# Patient Record
Sex: MALE | Race: WHITE | Employment: OTHER | URBAN - METROPOLITAN AREA
[De-identification: names, ages, dates, MRNs, and addresses within clinical notes are randomized per-mention and may not be internally consistent; named-entity substitution may affect disease eponyms.]

---

## 2022-01-21 ENCOUNTER — HOSPITAL ENCOUNTER (INPATIENT)
Age: 22
LOS: 3 days | Discharge: HOME | DRG: 885 | End: 2022-01-24
Attending: PSYCHIATRY & NEUROLOGY | Admitting: PSYCHIATRY & NEUROLOGY
Payer: OTHER GOVERNMENT

## 2022-01-21 PROBLEM — F32.2 MAJOR DEPRESSIVE DISORDER, SINGLE EPISODE, SEVERE WITH ANXIOUS DISTRESS (HCC): Status: ACTIVE | Noted: 2022-01-21

## 2022-01-21 PROBLEM — F32.A DEPRESSION: Status: ACTIVE | Noted: 2022-01-21

## 2022-01-21 PROBLEM — F32.A DEPRESSION: Status: RESOLVED | Noted: 2022-01-21 | Resolved: 2022-01-21

## 2022-01-21 PROCEDURE — 99222 1ST HOSP IP/OBS MODERATE 55: CPT | Performed by: PSYCHIATRY & NEUROLOGY

## 2022-01-21 PROCEDURE — 65220000003 HC RM SEMIPRIVATE PSYCH

## 2022-01-21 RX ORDER — HYDROXYZINE PAMOATE 50 MG/1
50 CAPSULE ORAL
Status: DISCONTINUED | OUTPATIENT
Start: 2022-01-21 | End: 2022-01-24 | Stop reason: HOSPADM

## 2022-01-21 RX ORDER — TRAZODONE HYDROCHLORIDE 50 MG/1
50 TABLET ORAL
Status: DISCONTINUED | OUTPATIENT
Start: 2022-01-21 | End: 2022-01-24 | Stop reason: HOSPADM

## 2022-01-21 NOTE — H&P
[address] HISTORY AND PHYSICAL    Name:  Nithin Chan  MR#:   [id number]  :  2000  ACCOUNT #:  [de-identified]  ADMIT DATE:  2022    IDENTIFYING INFORMATION:  The patient is a 58-year-old  male, admitted to this facility on the above-mentioned date. BASIS FOR ADMISSION:  The patient presented himself to [facility] on 2022 describing been feeling depressed since joining the [employer] three and a half years ago. He endorsed multiple problems including poor sleep, anhedonia, feelings of guilt, low energy and poor concentration. He also described a weight gain of around 40 pounds over the past year, this being related to lack of exercise and activity. Of importance is that he did mention that his depressive symptoms are almost nonexistent when he was away from the [employer] and goes home in [place]. When examined in the emergency department with the [facility], he was considered to be actively suicidal.  However, able to maintain self-control in the facility. For that reason, due to lack of bed availability, he was sent to our facility for further inpatient treatment, this resulting on his admission to the hospital.    PSYCHIATRIC HISTORY:  The patient has never been treated before. His difficulties with depression appeared to be again associated to his being in the [employer] as stated. He has been  for 1 year he said, and in general, the relationship with his wife is okay. However, it is not clear as to how open he is letting her know how depressed he has felt because of his being in the [employer]. He did say that as a result of his being depressed and specifically anhedonic, he tends not to do very much with his wife who is around his same age, wanting to go out and do things, something that he does not usually offer her to do. Some tension has been described, however in general, she remains supportive of him. She was the one who suggested the patient to go to the ER to be evaluated. MEDICAL HISTORY:  Negative with exception of depression. He denies any type of drug use. Weight gain associated to the depression described. REVIEW OF SYSTEMS:  Negative with exception of the psychiatric system review, remarkable for the presence of depression and suicidal thoughts. PHYSICAL EXAMINATION:  The physical exam at the [facility] facility, blood pressure was 119/75, heart rate 72, respirations 18, oxygen saturation rate 99%, temperature 98.2. Described as alert, in no acute physical distress. Physical examination was basically negative with exception of his psychiatric examination which was remarkable with presence of depression and suicidal thoughts. Neurological examination was also found to be negative. LAB TESTS:  Multiple labs were performed at the emergency department with the [facility] including a CBC with differential that basically showed normal test results. Blood chemistry showed a sodium of 140, potassium 3.9, chloride of 106, blood sugar 86, BUN 12.2, creatinine 0.90, estimated GFR above 60 mL/min, specifically 120.8 mL/minute. Liver function tests completely normal.  Alcohol levels below 10. COVID rapid testing had shown negative results. Acetaminophen and salicylate levels were below range. The urine drug screen was apparently negative. ALCOHOL AND DRUG HISTORY:  The patient denies the use of alcohol, illicit drugs, over-the-counter medications and/or abusing prescription medications. PERSONAL HISTORY AND FAMILY HISTORY:  The patient described that he is actually the youngest of three siblings. Has a brother and sister, both of them  with children of their own. The patient and his family had lived in the bottom part of [place], he said, for many years now. He met his wife around a year or so ago. She is the cousin cousin of one of his brother's friends.   Relationship is impaired because of the patient's depression and his inability to do things with her, he says. He has apparently another 3 or 4 years in the [facility]. He has tried to get outpatient appointments, however it has been difficult for him to do it through the [facility]. He indicated that the psychiatrist that he saw in the emergency room is suggesting the possibility of starting the steps that he has to take to be able to be released from the [facility]. MENTAL STATUS EXAM:  Is that of a male who looks his stated age. During this evaluation, there is no evidence of alcohol or any other type of drug-related signs of intoxication or withdrawal symptoms. He is coherent, shows quality of continuity of associations without evidence of flight of ideas, pressure of speech, ideas of reference or influence or any hallucinations. He is depressed. He described some helplessness and hopelessness and specifically anhedonia with suicidal thoughts, however he is able, willing and capable to talk to our staff if unable to control thoughts of self-harm. During the evaluation, there is no evidence of cognitive impairment even though he was not tested formally. He is considered to have capacity. CLINICAL IMPRESSION:  AXIS I:  Major depressive disorder, single, without psychosis. AXIS II:  Noncontributory. AXIS III:  Noncontributory. TREATMENT PLAN:  1. The patient was admitted to the adult CD program, will be seen daily and will be referred to the groups within context of the program.  2.  He will have an assigned inpatient  to help with providing the support and treatment that he requires individually, but also with referrals back to the [facility]. 3.  The patient was advised about our wanting to prescribe him with an antidepressant, however, he prefers not to take anything for his depression, he says.   He is very well aware that his depression is reactive to his being in the [facility], and not being able to be with his family in [place]. So he refuses antidepressant therapy. ESTIMATED LENGTH OF STAY AND PROGNOSIS:  ELOS is 3 to 5 days. The patient will have to have outpatient treatment after he is discharged from our facility. Depending upon his depression not improving enough, a decision will have to be made by the way if he actually will require to be referred back to the inpatient care in the [facility] and through this be discharged to outpatient treatment through, after being treated there as an inpatient. Obviously will depend upon how the patient does in several days. PROGNOSIS:  Fair. He will do better taking an antidepressant in addition to his having treatment with individual and group therapy sessions. However, again he prefers not to do so. So, his prognosis is only fair to guarded. Rock Debbie MD, LFAPA      [id number]  D:  01/21/2022 11:10  T:  01/21/2022 13:31  JOB #:  [id number]    Behavioral Services  Medicare Certification Upon Admission    I certify that this patient's inpatient psychiatric hospital admission is medically necessary for:      [x] Treatment which could reasonably be expected to improve this patient's condition,       [] For diagnostic study;     AND     [x] The inpatient psychiatric services are provided while the individual is under the care of a physician and are included in the individualized plan of care. Estimated length of stay/service 5 days or so. Plan for post-hospital care:  The patient will possibly be referred for admission to [facility].    Electronically signed by [unfilled] on 1/22/2022 at 12:24 PM

## 2022-01-21 NOTE — BH NOTES
Nursing Admission Note    Patient arrived onto unit via in a wheelchair dressed in  paper scrubs and accompanied by security staff. Patient is cooperative with admissions process, did fill out paperwork, and did allow nurse to orient to the unit. Patient currently on  safety/suicide/falls precautions, and general observation rounding. Patient Interacted appropriately and presents as Depressed, with Cooperative disposition and Preoccupied thought content. Patient reports intermittent depression and suicidal ideations since joining the [employer] approx 1 year ago. He denies particular stressors, just feeling \"trapped\" since joining. He denies physical health problems, does not take medication, and is not interested in taking medications while admitted. He states he has been advised by psychiatrist at [facility] on how to start the process of administrative discharge. Patient voluntary for treatment at this time. Patient given hygiene bag and escorted to room. Will continue to provide support as needed. RN's will initiate, develop, implement, revise or review treatment plan.

## 2022-01-21 NOTE — BSMART NOTE
Social Work Group Progress Note    Time: 8418    Attendance:  Did not attend     Participation Level: Did not attend     Observation: Did not attend

## 2022-01-21 NOTE — GROUP NOTE
LISA  GROUP DOCUMENTATION INDIVIDUAL                                                                          Group Therapy Note    Date: 1/21/2022    Group Start Time: 2532  Group End Time: 1430  Group Topic: Reflection/Relaxation    [facility] 1 ADULT CHEM [address], RN    IP [facility] GROUP DOCUMENTATION GROUP    Group Therapy Note    Attendees:6         Attendance: Did not attend      Rachel Donovan RN

## 2022-01-21 NOTE — PROGRESS NOTES
Problem: Suicide  Goal: *STG: Remains safe in hospital  Description: AEB pt not harming self or others, every shift  Outcome: Progressing Towards Goal     Problem: Falls - Risk of  Goal: *Absence of Falls  Description: Document Dafne Dahl Fall Risk and appropriate interventions in the flowsheet. Outcome: Progressing Towards Goal  Note: Fall Risk Interventions:  Problem: Suicide  Goal: *STG: Attends activities and groups  Description: AEB pt attending 3 groups, daily  1/21/2022 1524 by Pita Montes RN  Outcome: Not Progressing Towards Goal    [facility] has not eaten meals yet today. He is not currently on scheduled medication. This morning he appeared overwhelmed at the acuity of the unit and has spent the day in his room. Has not attended groups despite encouragement. Will continue to provide support as needed.

## 2022-01-21 NOTE — H&P
Psychiatry History and Physical    Subjective:     Date of Evaluation:  1/21/2022    Reason for Referral:  Jenny Greene was referred to the examiners from [facility] ED for SI. History of Presenting Problem: Jenny rGeene is a 25 yo M with no significant PMH who was admitted for SI. Denies HI and hallucinations. EtOH, Tox screen and COVID test all negative. He denies any physical complaints today. Patient Active Problem List    Diagnosis Date Noted    Major depressive disorder, single episode, severe with anxious distress ([address] 01/21/2022     No past medical history on file. No past surgical history on file. No family history on file.    Social History     Tobacco Use    Smoking status: Not on file    Smokeless tobacco: Not on file   Substance Use Topics    Alcohol use: Not on file     Prior to Admission medications    Not on File     No Known Allergies     Review of Systems - History obtained from chart review and the patient  General ROS: negative  Psychological ROS: positive for - depression and suicidal ideation  Ophthalmic ROS: negative  ENT ROS: negative  Respiratory ROS: negative  Cardiovascular ROS: negative  Gastrointestinal ROS: negative  Musculoskeletal ROS: negative  Neurological ROS: negative  Dermatological ROS: negative      Objective:     Patient Vitals for the past 8 hrs:   BP Temp Pulse Resp Height Weight   01/21/22 0836 121/75 98 °F (36.7 °C) 87 18     01/21/22 0814     6' (1.829 m) 108.9 kg (240 lb)       Mental Status exam: WNL except for    Sensorium  oriented to time, place and person   Orientation situation   Relations cooperative   Eye Contact appropriate   Appearance:  age appropriate   Motor Behavior:  within normal limits   Speech:  normal pitch and normal volume   Vocabulary average   Thought Process: goal directed   Thought Content free of delusions and free of hallucinations   Suicidal ideations intention and no plan    Homicidal ideations no plan  and no intention Mood:  stable   Affect:  stable   Memory recent  adequate   Memory remote:  adequate   Concentration:  adequate   Abstraction:  concrete   Insight:  fair   Reliability good   Judgment:  fair         Physical Exam:   Visit Vitals  /75   Pulse 87   Temp 98 °F (36.7 °C)   Resp 18   Ht 6' (1.829 m)   Wt 108.9 kg (240 lb)   BMI 32.55 kg/m²     General appearance: alert, cooperative, no distress, appears stated age  Head: Normocephalic, without obvious abnormality, atraumatic  Eyes: negative  Ears: normal TM's and external ear canals AU  Nose: Nares normal. Septum midline. Mucosa normal. No drainage or sinus tenderness. Throat: Lips, mucosa, and tongue normal. Teeth and gums normal  Neck: supple, symmetrical, trachea midline and no adenopathy  Lungs: clear to auscultation bilaterally  Heart: regular rate and rhythm, S1, S2 normal, no murmur, click, rub or gallop  Abdomen: soft, non-tender. Extremities: extremities normal, atraumatic, no cyanosis or edema  Skin: Skin color, texture, turgor normal. No rashes or lesions  Neurologic: Grossly normal        Impression:      Principal Problem:    Major depressive disorder, single episode, severe with anxious distress ([facility] (1/21/2022)          Plan:     Recommendations for Treatment/Conditions:  Psychiatric treatment recommended while in hospital  Admit to inpatient psych for SI    Referral To:    Inpatient psychiatric care        [place], [place]   1/21/2022 11:49 AM

## 2022-01-22 LAB — TSH SERPL DL<=0.05 MIU/L-ACNC: 2.12 UIU/ML (ref 0.36–3.74)

## 2022-01-22 PROCEDURE — 65220000003 HC RM SEMIPRIVATE PSYCH

## 2022-01-22 PROCEDURE — 99231 SBSQ HOSP IP/OBS SF/LOW 25: CPT | Performed by: PSYCHIATRY & NEUROLOGY

## 2022-01-22 PROCEDURE — 36415 COLL VENOUS BLD VENIPUNCTURE: CPT

## 2022-01-22 PROCEDURE — 84443 ASSAY THYROID STIM HORMONE: CPT

## 2022-01-22 NOTE — PROGRESS NOTES
Problem: Suicide  Goal: *STG: Remains safe in hospital  Description: AEB pt not harming self or others, every shift  Outcome: Progressing Towards Goal  Goal: *STG: Seeks staff when feelings of self harm or harm towards others arise  Description: AEB seeking staff when feelings of self harm or harm towards others arise, as needed  Outcome: Progressing Towards Goal  Goal: *STG: Attends activities and groups  Description: AEB pt attending 3 groups, daily  Outcome: Progressing Towards Goal  Patient isolative to his room, voiced no complaint. Out of room for meals. Affect is flat, mood stable.

## 2022-01-22 NOTE — BH NOTES
Pt presents with dull affect, depressed mood. Pt has been withdrawn to self on the unit. Pt has been resting in his room for much of the evening. Pt denies SI/HI at this time. Pt is medication compliant. Will continue to monitor.

## 2022-01-22 NOTE — PROGRESS NOTES
[address] [facility] [address]  Inpatient Progress Note     Date of Service: 01/22/22  Hospital Day: 1     Subjective/Interval History   01/22/22    Treatment Team Notes:  Notes reviewed and/or discussed and report that Dixon Lomeli is the patient recently admitted to the facility, attention invited to the dictated admission note which is self-explanatory. Patient interview: Dixon Lomeli was interviewed by this writer today. Not much of a change regarding the patient's depression severity. However he continues to agree to talk to his staff if unable to maintain self-control. He has been somewhat withdrawn, and is still does not want to consider treatment with antidepressant therapy, however otherwise he is rather compliant. Objective     Visit Vitals  /77   Pulse 90   Temp 97.2 °F (36.2 °C)   Resp 16   Ht 6' (1.829 m)   Wt 108.9 kg (240 lb)   BMI 32.55 kg/m²     Vitals are stable    Recent Results (from the past 24 hour(s))   TSH 3RD GENERATION    Collection Time: 01/22/22 10:27 AM   Result Value Ref Range    TSH 2.12 0.36 - 3.74 uIU/mL     Above results noted    Mental Status Examination     Appearance/Hygiene 24 y.o. WHITE/NON- male  Hygiene: somewhat limited   Behavior/Social Relatedness  withdrawn at times   Musculoskeletal Gait/Station: appropriate  Tone (flaccid, cogwheeling, spastic): not assessed  Psychomotor (hyperkinetic, hypokinetic): calm   Involuntary movements (tics, dyskinesias, akathisa, stereotypies): none   Speech   Rate, rhythm, volume, fluency and articulation are appropriate   Mood   depressed   Affect    congruent   Thought Process Linear and goal directed   Thought Content and Perceptual Disturbances  suicidal thoughts are improving, denies auditory and visual hallucinations, ideas of reference or influence or any delusional thoughts. However he still feels helpless at times.    Sensorium and Cognition  Grossly intact   Insight  fair   Judgment  fair Assessment/Plan      Psychiatric Diagnoses:   Patient Active Problem List   Diagnosis Code    Major depressive disorder, single episode, severe with anxious distress ([facility] F32.2       Medical Diagnoses: Same    Psychosocial and contextual factors: Same    Level of impairment/disability: To be determined. 1.  We will continue to suggest treatment with antidepressant therapy. Otherwise depending upon how the patient does in the near future, further hospitalization or supplemental hospital will be determined. 2.  Reviewed instructions, risks, benefits and side effects of medications  3. Disposition/Discharge Date: self-care/home, to be determined.     Sandrine Barrios MD, [address] [place] [address]  Psychiatry

## 2022-01-23 PROCEDURE — 99231 SBSQ HOSP IP/OBS SF/LOW 25: CPT | Performed by: PSYCHIATRY & NEUROLOGY

## 2022-01-23 PROCEDURE — 65220000003 HC RM SEMIPRIVATE PSYCH

## 2022-01-23 NOTE — PROGRESS NOTES
[address]  Inpatient Progress Note     Date of Service: 01/23/22  Hospital Day: 2     Subjective/Interval History   01/23/22    Treatment Team Notes:  Notes reviewed and/or discussed and report that Oma Gould is a patient with a history of depression, attention invited to the dictated admission note which is self-explanatory. The patient was referred to acute inpatient psychiatric care here at [facility] after being evaluated at [facility] emergency department. Patient interview: Oma Gould was interviewed by this writer today. The patient described his depression improving, even though that he has refused treatment with antidepressant therapy. During initial part of the session, he indicated that he wanted to be discharged from inpatient care, however he was advised about the steps that we have to take since he was referred to us from the  [facility].  If the patient continues to demand discharge, we will evaluate for a temporary detaining order. Nursing staff informed. Objective     Visit Vitals  /72   Pulse 73   Temp 97 °F (36.1 °C)   Resp 17   Ht 6' (1.829 m)   Wt 108.9 kg (240 lb)   BMI 32.55 kg/m²     Vitals are stable    No results found for this or any previous visit (from the past 24 hour(s)). Mental Status Examination     Appearance/Hygiene 24 y.o.  WHITE/NON- male  Hygiene: Limited   Behavior/Social Relatedness  withdrawn   Musculoskeletal Gait/Station: appropriate  Tone (flaccid, cogwheeling, spastic): not assessed  Psychomotor (hyperkinetic, hypokinetic): calm   Involuntary movements (tics, dyskinesias, akathisa, stereotypies): none   Speech   Rate, rhythm, volume, fluency and articulation are appropriate   Mood   depression is improving   Affect    congruent   Thought Process Linear and goal directed   Thought Content and Perceptual Disturbances Denies self-injurious behavior (SIB), suicidal ideation (SI), aggressive behavior or homicidal ideation (HI)    Denies auditory and visual hallucinations, intensity of his helplessness and hopelessness is less. Sensorium and Cognition  Grossly intact   Insight   Limited   Judgment  fair        Assessment/Plan      Psychiatric Diagnoses:   Patient Active Problem List   Diagnosis Code    Major depressive disorder, single episode, severe with anxious distress ([facility] F32.2       Medical Diagnoses: Same    Psychosocial and contextual factors: Same    Level of impairment/disability: Moderate    1.  the assigned inpatient  will be asked tomorrow to contact personal at the [facility].  Very possibly before he is directly referred to outpatient treatment, he will have to be evaluated by one of their psychiatrists. The patient was informed about this. 2.  Reviewed instructions, risks, benefits and side effects of medications  3.   Disposition/Discharge Date: self-care/home, TBD    Tai Pal MD, [address] [place] [address]

## 2022-01-23 NOTE — PROGRESS NOTES
Problem: Suicide  Goal: *STG: Remains safe in hospital  Description: AEB pt not harming self or others, every shift  Outcome: Progressing Towards Goal  Goal: *STG: Seeks staff when feelings of self harm or harm towards others arise  Description: AEB seeking staff when feelings of self harm or harm towards others arise, as needed  Outcome: Progressing Towards Goal  Goal: *STG: Attends activities and groups  Description: AEB pt attending 3 groups, daily  Outcome: Progressing Towards Goal   Patient has remained in his room , out for meals, withdrawn, affect flat. Continue to be depressed. Will continue to monitor and provide a safe environment.

## 2022-01-23 NOTE — GROUP NOTE
IP  GROUP DOCUMENTATION INDIVIDUAL                                                                          Group Therapy Note    Date: 1/22/2022    Group Start Time: 2000  Group End Time: 2030  Group Topic: Medication    [facility] 1 ADULT CHEM DEP    Robbi Delcid RN    IP [facility] GROUP DOCUMENTATION GROUP    Group Therapy Note    Attendees: 7         Attendance: Did not attend    Patient's Goal:  Understanding medication being provided as scheduled and as needed. Additional Notes:  Pt has been quiet and isolated to self. Alert and oriented x 4. Will continue to monitor and support as needed.      Ronda Ham RN

## 2022-01-23 NOTE — DISCHARGE INSTRUCTIONS
BEHAVIORAL HEALTH NURSING DISCHARGE NOTE      The following personal items collected during your admission are returned to you:   Dental Appliance: Dental Appliances: None  Vision:    Hearing Aid:    Jewelry:    Clothing:    Other Valuables:    Valuables sent to safe:        PATIENT INSTRUCTIONS:    Pt received discharge instructions, prescriptions, and emergency numbers. Pt completed satisfaction survey. All personal belongings returned to pt. Pt encouraged to follow-up with outpatient resources and to call with any questions or concerns. The discharge information has been reviewed with the patient. The patient verbalized understanding.

## 2022-01-24 VITALS
WEIGHT: 240 LBS | HEIGHT: 72 IN | BODY MASS INDEX: 32.51 KG/M2 | DIASTOLIC BLOOD PRESSURE: 76 MMHG | RESPIRATION RATE: 17 BRPM | TEMPERATURE: 97.6 F | SYSTOLIC BLOOD PRESSURE: 113 MMHG | HEART RATE: 90 BPM

## 2022-01-24 PROCEDURE — 99238 HOSP IP/OBS DSCHRG MGMT 30/<: CPT | Performed by: PSYCHIATRY & NEUROLOGY

## 2022-01-24 NOTE — BSMART NOTE
SW Contact:        Pt is a 65-year-old  male, referred from [facility] on 01/19/2022 describing been feeling depressed since joining the [employer] three and a half years ago. He endorsed multiple problems including poor sleep, anhedonia, feelings of guilt, low energy and poor concentration. One of few times his depressive symptoms are almost nonexistent is when was away from the [employer] and goes home in [place]. He is  with limited ADL's. CLINICAL IMPRESSION:  AXIS I:  Major depressive disorder, single, without psychosis. AXIS II:  Noncontributory. AXIS III:  Noncontributory. Interaction: Reviewed d/c and safety plan to include:     Collateral contact: with his Command #[phone]      Spoke to [address] [place] [address]: We reviewed d/c & safety plan & they agreeing they will make sure pt sets up appointments with [address] [place] [address] for follow up immediately & hopefully appointment will be within x48 hrs.  Their at  # [phone]     [employer] will  pt after 2pm today     Dr & tx team updated

## 2022-01-24 NOTE — DISCHARGE SUMMARY
[address] [facility]    Name:  Wing Ruiz  MR#:   [id number]  :  2000  ACCOUNT #:  [de-identified]  ADMIT DATE:  2022  DISCHARGE DATE:  2022      SIGNIFICANT FINDINGS:  History and physical exam was performed shortly after the patient was admitted to the facility, attention invited to this document, a place where the patient required to be psychiatrically admitted after being evaluated at [facility] on 2022 are very clearly stated. The patient's psychiatric history is also described in the dictated admission note which is self-explanatory. However, for the purpose of this discharge summary, the reader is advised that indeed the patient, who joined the [employer] 3-1/2 years or so ago, when presenting himself to the emergency room at [facility], described poor sleep, anhedonia, feelings of guilt, low energy, poor concentration, and also suicidal thoughts. He described a weight gain of around 40 pounds over the past year, this being related to lack of exercise and activity. He described that his depressive symptoms, however, were almost nonexistent when he was away from the [employer] and was home in [place]. When examined in the emergency department, he was considered to be actively suicidal, however, able to maintain self-control in the facility, the patient continued to require further inpatient treatment. Due to lack of bed availability, he was sent to [facility] for further inpatient treatment, this resulting in his admission to our hospital.    The patient's psychiatric history remarkable for his never been treated before. His difficulties with depression appeared to be associated to his being in the [employer] as stated.   He has been  for 1 year and in general the relationship with his wife is appropriate, however, it is not clear as to how often he is letting her know how depressed he had felt because of his being in the [employer]. Due to his depression and his not being as active as he wanted to be, some tension was described between the patient and his wife, however, she was described as being supportive of him. Medical history negative with exception of depression. Physical exam in the emergency department was that of a patient with blood pressure 119/75, heart rate 72, respirations 18, oxygen saturation of 99% at room air, temperature 98.2. Alert, in no acute physical distress. The physical exam was basically negative with exception of his psychiatric examination which was remarkable for the presence of depression and suicidal thoughts. Neurological examination was found to be negative. Lab tests performed at [facility] included a CBC with differential that showed basically normal test results. Blood chemistry showed normal electrolytes with a blood sugar of 86, normal kidney functioning tests, and normal liver function tests. Alcohol levels below 10 mg/dL. COVID rapid testing showed negative results. Acetaminophen and salicylate levels were below range. Urine drug screen was negative. COURSE DURING HOSPITALIZATION AND TREATMENT:  Admitted to the adult CD program, the patient has been seen daily and has been referred to the groups within context of the program.  Withdrawn, not feeling that he was able to relate \"to the other patients in the program,\" the patient described his depression nonetheless improving and feeling after a while much more stable. Attempts followed to provide the patient treatment with antidepressant therapy, however, he refused to do so. The reason for this, he said, is that his wife had apparently received treatment with antidepressants before and some type of antianxiety agents and took \"months\" for her to be able to be off them.   Regardless, the patient did describe that his symptoms of depression improving, he is currently denying any suicidal thoughts, is showing no evidence of psychosis and/or cognitive impairment and so he is agreeable to be referred to outpatient treatment. The patient's command has been called by the assigned inpatient  with them coming to pick him up today. Further outpatient treatment through [facility] strongly suggested. While in the facility, a physical exam was performed by Ainsley Keating, which basically confirmed the findings upon the patient's examination at the [facility].    The only lab test performed here was a TSH to rule out thyroid disease, this coming back normal at 2.12 international units/mL. CONDITION UPON DISCHARGE:  Not suicidal or homicidal, showing no evidence of psychosis and/or cognitive impairment. The patient is depressed, however, stable and ready to outpatient treatment. FINAL DIAGNOSES:  AXIS I:  Major depressive disorder, single episode, without psychotic symptoms. AXIS II:  Noncontributory. AXIS III:  Noncontributory. DISPOSITION:  The patient is being discharged to outpatient care to be provided through [facility]. His command has been informed of his being here and will send someone to pick him up. While the patient was here, he again refused treatment with antidepressant therapy, however, this is suggested to be considered when the patient is being followed at [facility] and [facility]. PRESCRIPTIONS UPON DISCHARGE:  Not given due to the above-mentioned reasons. PROGNOSIS:  It will entirely depend upon the patient's treatment response and treatment compliance as an outpatient.       Regina Soriano MD, LFAPA      [id number]  D:  01/24/2022 11:20  T:  01/24/2022 12:44  JOB #:  [id number]

## 2022-01-24 NOTE — GROUP NOTE
IP  GROUP DOCUMENTATION INDIVIDUAL                                                                          Group Therapy Note    Date: 1/23/2022    Group Start Time: 2000  Group End Time: 2015  Group Topic: Medication    [facility] ADULT CHEM DEP    Julieta Rosas, RICHELLE    IP [address] [facility] GROUP DOCUMENTATION GROUP    Group Therapy Note    Attendees: 9           Attendance: Did not attend    Patient's Goal:  Understanding medication being provided as schedule and as needed medication. Additional Notes:  Pt spent some time in day area quiet, but did interact with peers appropriately when approached. Alert and oriented x 4. Pt has been encouraged multiple times to attend group, but refused each attempt. Will continue to monitor, encourage group participation, and support as needed.     Ruth Jo RN Detail Level: Generalized Detail Level: Zone Detail Level: Simple

## 2022-01-25 ENCOUNTER — TELEPHONE (OUTPATIENT)
Dept: ADDICTION MEDICINE | Age: 22
End: 2022-01-25

## 2022-01-25 NOTE — TELEPHONE ENCOUNTER
This writer attempted to complete follow-up call at 5 to phone number on file of [phone]. Left message on machine requesting a return call. No alternate phone number listed.